# Patient Record
Sex: FEMALE | URBAN - NONMETROPOLITAN AREA
[De-identification: names, ages, dates, MRNs, and addresses within clinical notes are randomized per-mention and may not be internally consistent; named-entity substitution may affect disease eponyms.]

---

## 2023-09-20 ENCOUNTER — TELEPHONE ENCOUNTER (OUTPATIENT)
Dept: URBAN - NONMETROPOLITAN AREA CLINIC 2 | Facility: CLINIC | Age: 43
End: 2023-09-20

## 2023-09-20 ENCOUNTER — LAB OUTSIDE AN ENCOUNTER (OUTPATIENT)
Dept: URBAN - NONMETROPOLITAN AREA CLINIC 2 | Facility: CLINIC | Age: 43
End: 2023-09-20

## 2023-09-20 ENCOUNTER — OFFICE VISIT (OUTPATIENT)
Dept: URBAN - NONMETROPOLITAN AREA CLINIC 2 | Facility: CLINIC | Age: 43
End: 2023-09-20
Payer: COMMERCIAL

## 2023-09-20 ENCOUNTER — DASHBOARD ENCOUNTERS (OUTPATIENT)
Age: 43
End: 2023-09-20

## 2023-09-20 DIAGNOSIS — D50.8 OTHER IRON DEFICIENCY ANEMIA: ICD-10-CM

## 2023-09-20 DIAGNOSIS — Z12.11 COLON CANCER SCREENING: ICD-10-CM

## 2023-09-20 DIAGNOSIS — I78.0 HHT (HEREDITARY HEMORRHAGIC TELANGIECTASIA): ICD-10-CM

## 2023-09-20 DIAGNOSIS — Z98.890 HISTORY OF NISSEN FUNDOPLICATION: ICD-10-CM

## 2023-09-20 DIAGNOSIS — Z83.71 FAMILY HISTORY OF COLONIC POLYPS: ICD-10-CM

## 2023-09-20 PROBLEM — 429969003: Status: ACTIVE | Noted: 2023-09-20

## 2023-09-20 PROBLEM — 21877004: Status: ACTIVE | Noted: 2023-09-20

## 2023-09-20 PROBLEM — 305058001: Status: ACTIVE | Noted: 2023-09-20

## 2023-09-20 PROBLEM — 428847004: Status: ACTIVE | Noted: 2023-09-20

## 2023-09-20 PROBLEM — 87522002: Status: ACTIVE | Noted: 2023-09-20

## 2023-09-20 PROCEDURE — 99204 OFFICE O/P NEW MOD 45 MIN: CPT | Performed by: NURSE PRACTITIONER

## 2023-09-20 RX ORDER — SODIUM PICOSULFATE, MAGNESIUM OXIDE, AND ANHYDROUS CITRIC ACID 12; 3.5; 1 G/175ML; G/175ML; MG/175ML
175 ML THE FIRST DOSE THE EVENING BEFORE AND SECOND DOSE THE MORNING OF COLONOSCOPY LIQUID ORAL TWICE DAILY
Qty: 350 MILLILITER | Refills: 0 | OUTPATIENT
Start: 2023-09-20 | End: 2023-09-21

## 2023-09-20 NOTE — HPI-TODAY'S VISIT:
9/20/2023 Ashwini is a 53 year-old female who presents for consultation of colorectal cancer screening, history of HHT, and iron deficiency anemia.  Earlier this year she had a routine blood test with mild iron deficiency anemia.  She took oral iron and her repeat labs showed improvement.  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  She has never had an EGD or colonoscopy.  She denies any GI complaints.  She status post Nissen fundoplication in the past for reflux.  Her bowels are moving regularly with no rectal bleeding or melena.  Today we have discussed her work-up.  She follows with Dr. Owusu in [place] [facility].  He is recommended she pursue EGD with colonoscopy.  We will schedule today at [facility].  We have discussed risk and benefits and she agrees to pursue.MB

## 2023-09-20 NOTE — PHYSICAL EXAM CONSTITUTIONAL:
----- Message from Greta Klein MD sent at 1/4/2018 11:50 AM EST -----  Please let pt know event monitor was normal. No additional cardiac evaluation indicated. thx      LVM for patient to return call at earliest convenience. Patient returned call and above test results given.   2 pt identifiers used well developed, well nourished , in no acute distress, normal communication ability

## 2023-12-06 ENCOUNTER — TELEPHONE ENCOUNTER (OUTPATIENT)
Dept: URBAN - NONMETROPOLITAN AREA CLINIC 2 | Facility: CLINIC | Age: 43
End: 2023-12-06